# Patient Record
Sex: FEMALE | Race: WHITE | Employment: UNEMPLOYED | URBAN - METROPOLITAN AREA
[De-identification: names, ages, dates, MRNs, and addresses within clinical notes are randomized per-mention and may not be internally consistent; named-entity substitution may affect disease eponyms.]

---

## 2019-08-08 RX ORDER — CITALOPRAM 10 MG/1
10 TABLET ORAL DAILY
COMMUNITY

## 2019-08-08 RX ORDER — ATORVASTATIN CALCIUM 40 MG/1
40 TABLET, FILM COATED ORAL DAILY
COMMUNITY

## 2019-08-08 RX ORDER — LISINOPRIL 10 MG/1
12.5 TABLET ORAL DAILY
COMMUNITY

## 2019-08-08 RX ORDER — METFORMIN HYDROCHLORIDE 500 MG/1
500 TABLET, EXTENDED RELEASE ORAL
COMMUNITY

## 2019-08-08 RX ORDER — FENOFIBRATE 54 MG/1
54 TABLET ORAL DAILY
COMMUNITY

## 2019-08-13 ENCOUNTER — ANESTHESIA (OUTPATIENT)
Dept: INTERVENTIONAL RADIOLOGY/VASCULAR | Age: 69
End: 2019-08-13
Payer: MEDICARE

## 2019-08-13 ENCOUNTER — ANESTHESIA EVENT (OUTPATIENT)
Dept: OPERATING ROOM | Age: 69
End: 2019-08-13
Payer: MEDICARE

## 2019-08-13 ENCOUNTER — APPOINTMENT (OUTPATIENT)
Dept: GENERAL RADIOLOGY | Age: 69
End: 2019-08-13
Attending: UROLOGY
Payer: MEDICARE

## 2019-08-13 ENCOUNTER — HOSPITAL ENCOUNTER (OUTPATIENT)
Dept: INTERVENTIONAL RADIOLOGY/VASCULAR | Age: 69
Setting detail: OUTPATIENT SURGERY
Discharge: HOME | End: 2019-08-15
Attending: UROLOGY
Payer: MEDICARE

## 2019-08-13 ENCOUNTER — ANESTHESIA (OUTPATIENT)
Dept: OPERATING ROOM | Age: 69
End: 2019-08-13
Payer: MEDICARE

## 2019-08-13 ENCOUNTER — ANESTHESIA EVENT (OUTPATIENT)
Dept: INTERVENTIONAL RADIOLOGY/VASCULAR | Age: 69
End: 2019-08-13
Payer: MEDICARE

## 2019-08-13 ENCOUNTER — HOSPITAL ENCOUNTER (OUTPATIENT)
Age: 69
Setting detail: OBSERVATION
Discharge: HOME | End: 2019-08-15
Attending: UROLOGY | Admitting: UROLOGY
Payer: MEDICARE

## 2019-08-13 VITALS
OXYGEN SATURATION: 100 % | DIASTOLIC BLOOD PRESSURE: 82 MMHG | SYSTOLIC BLOOD PRESSURE: 106 MMHG | RESPIRATION RATE: 10 BRPM | TEMPERATURE: 95.8 F

## 2019-08-13 VITALS
OXYGEN SATURATION: 98 % | TEMPERATURE: 95.8 F | RESPIRATION RATE: 15 BRPM | SYSTOLIC BLOOD PRESSURE: 101 MMHG | DIASTOLIC BLOOD PRESSURE: 56 MMHG

## 2019-08-13 VITALS
OXYGEN SATURATION: 97 % | SYSTOLIC BLOOD PRESSURE: 122 MMHG | DIASTOLIC BLOOD PRESSURE: 63 MMHG | RESPIRATION RATE: 12 BRPM

## 2019-08-13 DIAGNOSIS — G89.18 POST-OP PAIN: Primary | ICD-10-CM

## 2019-08-13 LAB
ANION GAP SERPL CALCULATED.3IONS-SCNC: 11 MMOL/L (ref 9–17)
BUN BLDV-MCNC: 23 MG/DL (ref 8–23)
BUN/CREAT BLD: ABNORMAL (ref 9–20)
CALCIUM SERPL-MCNC: 9.9 MG/DL (ref 8.6–10.4)
CHLORIDE BLD-SCNC: 104 MMOL/L (ref 98–107)
CO2: 24 MMOL/L (ref 20–31)
CREAT SERPL-MCNC: 1.4 MG/DL (ref 0.5–0.9)
GFR AFRICAN AMERICAN: 45 ML/MIN
GFR NON-AFRICAN AMERICAN: 38 ML/MIN
GFR SERPL CREATININE-BSD FRML MDRD: ABNORMAL ML/MIN/{1.73_M2}
GFR SERPL CREATININE-BSD FRML MDRD: ABNORMAL ML/MIN/{1.73_M2}
GLUCOSE BLD-MCNC: 232 MG/DL (ref 65–105)
GLUCOSE BLD-MCNC: 243 MG/DL (ref 65–105)
GLUCOSE BLD-MCNC: 244 MG/DL (ref 65–105)
GLUCOSE BLD-MCNC: 257 MG/DL (ref 65–105)
GLUCOSE BLD-MCNC: 263 MG/DL (ref 70–99)
HCT VFR BLD CALC: 39.7 % (ref 36.3–47.1)
HCT VFR BLD CALC: 40.9 % (ref 36.3–47.1)
HEMOGLOBIN: 12.7 G/DL (ref 11.9–15.1)
HEMOGLOBIN: 13 G/DL (ref 11.9–15.1)
MCH RBC QN AUTO: 27.2 PG (ref 25.2–33.5)
MCH RBC QN AUTO: 27.8 PG (ref 25.2–33.5)
MCHC RBC AUTO-ENTMCNC: 31.1 G/DL (ref 28.4–34.8)
MCHC RBC AUTO-ENTMCNC: 32.7 G/DL (ref 28.4–34.8)
MCV RBC AUTO: 84.8 FL (ref 82.6–102.9)
MCV RBC AUTO: 87.6 FL (ref 82.6–102.9)
NRBC AUTOMATED: 0 PER 100 WBC
NRBC AUTOMATED: 0 PER 100 WBC
PARTIAL THROMBOPLASTIN TIME: 22.7 SEC (ref 20.5–30.5)
PDW BLD-RTO: 12.5 % (ref 11.8–14.4)
PDW BLD-RTO: 12.5 % (ref 11.8–14.4)
PLATELET # BLD: 241 K/UL (ref 138–453)
PLATELET # BLD: 248 K/UL (ref 138–453)
PMV BLD AUTO: 10 FL (ref 8.1–13.5)
PMV BLD AUTO: 9.9 FL (ref 8.1–13.5)
POTASSIUM SERPL-SCNC: 5.4 MMOL/L (ref 3.7–5.3)
RBC # BLD: 4.67 M/UL (ref 3.95–5.11)
RBC # BLD: 4.68 M/UL (ref 3.95–5.11)
SODIUM BLD-SCNC: 139 MMOL/L (ref 135–144)
WBC # BLD: 12.2 K/UL (ref 3.5–11.3)
WBC # BLD: 8.2 K/UL (ref 3.5–11.3)

## 2019-08-13 PROCEDURE — 3700000000 HC ANESTHESIA ATTENDED CARE: Performed by: UROLOGY

## 2019-08-13 PROCEDURE — 3700000001 HC ADD 15 MINUTES (ANESTHESIA): Performed by: UROLOGY

## 2019-08-13 PROCEDURE — 6360000002 HC RX W HCPCS: Performed by: SPECIALIST

## 2019-08-13 PROCEDURE — 3600000014 HC SURGERY LEVEL 4 ADDTL 15MIN: Performed by: UROLOGY

## 2019-08-13 PROCEDURE — 6360000002 HC RX W HCPCS: Performed by: STUDENT IN AN ORGANIZED HEALTH CARE EDUCATION/TRAINING PROGRAM

## 2019-08-13 PROCEDURE — 74018 RADEX ABDOMEN 1 VIEW: CPT

## 2019-08-13 PROCEDURE — 6360000002 HC RX W HCPCS: Performed by: NURSE ANESTHETIST, CERTIFIED REGISTERED

## 2019-08-13 PROCEDURE — 6360000004 HC RX CONTRAST MEDICATION: Performed by: UROLOGY

## 2019-08-13 PROCEDURE — 7100000000 HC PACU RECOVERY - FIRST 15 MIN: Performed by: UROLOGY

## 2019-08-13 PROCEDURE — 3700000000 HC ANESTHESIA ATTENDED CARE

## 2019-08-13 PROCEDURE — 2580000003 HC RX 258: Performed by: UROLOGY

## 2019-08-13 PROCEDURE — 6370000000 HC RX 637 (ALT 250 FOR IP): Performed by: STUDENT IN AN ORGANIZED HEALTH CARE EDUCATION/TRAINING PROGRAM

## 2019-08-13 PROCEDURE — 6360000002 HC RX W HCPCS: Performed by: ANESTHESIOLOGY

## 2019-08-13 PROCEDURE — 3600000013 HC SURGERY LEVEL 3 ADDTL 15MIN: Performed by: UROLOGY

## 2019-08-13 PROCEDURE — 82947 ASSAY GLUCOSE BLOOD QUANT: CPT

## 2019-08-13 PROCEDURE — 7100000040 HC SPAR PHASE II RECOVERY - FIRST 15 MIN: Performed by: UROLOGY

## 2019-08-13 PROCEDURE — 7100000001 HC PACU RECOVERY - ADDTL 15 MIN: Performed by: UROLOGY

## 2019-08-13 PROCEDURE — 3600000003 HC SURGERY LEVEL 3 BASE: Performed by: UROLOGY

## 2019-08-13 PROCEDURE — 2580000003 HC RX 258: Performed by: STUDENT IN AN ORGANIZED HEALTH CARE EDUCATION/TRAINING PROGRAM

## 2019-08-13 PROCEDURE — 3700000001 HC ADD 15 MINUTES (ANESTHESIA)

## 2019-08-13 PROCEDURE — 2580000003 HC RX 258: Performed by: ANESTHESIOLOGY

## 2019-08-13 PROCEDURE — 2580000003 HC RX 258: Performed by: SPECIALIST

## 2019-08-13 PROCEDURE — 85730 THROMBOPLASTIN TIME PARTIAL: CPT

## 2019-08-13 PROCEDURE — 2500000003 HC RX 250 WO HCPCS: Performed by: SPECIALIST

## 2019-08-13 PROCEDURE — 2720000010 HC SURG SUPPLY STERILE: Performed by: UROLOGY

## 2019-08-13 PROCEDURE — 7100000041 HC SPAR PHASE II RECOVERY - ADDTL 15 MIN: Performed by: UROLOGY

## 2019-08-13 PROCEDURE — 3600000004 HC SURGERY LEVEL 4 BASE: Performed by: UROLOGY

## 2019-08-13 PROCEDURE — 87086 URINE CULTURE/COLONY COUNT: CPT

## 2019-08-13 PROCEDURE — 85027 COMPLETE CBC AUTOMATED: CPT

## 2019-08-13 PROCEDURE — 2709999900 HC NON-CHARGEABLE SUPPLY: Performed by: UROLOGY

## 2019-08-13 PROCEDURE — C1758 CATHETER, URETERAL: HCPCS | Performed by: UROLOGY

## 2019-08-13 PROCEDURE — G0378 HOSPITAL OBSERVATION PER HR: HCPCS

## 2019-08-13 PROCEDURE — C1769 GUIDE WIRE: HCPCS

## 2019-08-13 PROCEDURE — 50433 PLMT NEPHROURETERAL CATHETER: CPT | Performed by: RADIOLOGY

## 2019-08-13 PROCEDURE — 6360000002 HC RX W HCPCS: Performed by: UROLOGY

## 2019-08-13 PROCEDURE — C2628 CATHETER, OCCLUSION: HCPCS | Performed by: UROLOGY

## 2019-08-13 PROCEDURE — 80048 BASIC METABOLIC PNL TOTAL CA: CPT

## 2019-08-13 PROCEDURE — C2617 STENT, NON-COR, TEM W/O DEL: HCPCS | Performed by: UROLOGY

## 2019-08-13 PROCEDURE — 2709999900 HC NON-CHARGEABLE SUPPLY

## 2019-08-13 PROCEDURE — C1769 GUIDE WIRE: HCPCS | Performed by: UROLOGY

## 2019-08-13 PROCEDURE — C1726 CATH, BAL DIL, NON-VASCULAR: HCPCS | Performed by: UROLOGY

## 2019-08-13 PROCEDURE — C1894 INTRO/SHEATH, NON-LASER: HCPCS | Performed by: UROLOGY

## 2019-08-13 DEVICE — URETERAL STENT
Type: IMPLANTABLE DEVICE | Status: FUNCTIONAL
Brand: POLARIS™ ULTRA

## 2019-08-13 RX ORDER — CIPROFLOXACIN 2 MG/ML
400 INJECTION, SOLUTION INTRAVENOUS ONCE
Status: COMPLETED | OUTPATIENT
Start: 2019-08-13 | End: 2019-08-13

## 2019-08-13 RX ORDER — PROMETHAZINE HYDROCHLORIDE 25 MG/ML
6.25 INJECTION, SOLUTION INTRAMUSCULAR; INTRAVENOUS
Status: DISCONTINUED | OUTPATIENT
Start: 2019-08-13 | End: 2019-08-13 | Stop reason: HOSPADM

## 2019-08-13 RX ORDER — 0.9 % SODIUM CHLORIDE 0.9 %
500 INTRAVENOUS SOLUTION INTRAVENOUS
Status: DISCONTINUED | OUTPATIENT
Start: 2019-08-13 | End: 2019-08-13 | Stop reason: HOSPADM

## 2019-08-13 RX ORDER — MORPHINE SULFATE 2 MG/ML
2 INJECTION, SOLUTION INTRAMUSCULAR; INTRAVENOUS EVERY 4 HOURS PRN
Status: DISCONTINUED | OUTPATIENT
Start: 2019-08-13 | End: 2019-08-15

## 2019-08-13 RX ORDER — FENTANYL CITRATE 50 UG/ML
25 INJECTION, SOLUTION INTRAMUSCULAR; INTRAVENOUS EVERY 5 MIN PRN
Status: DISCONTINUED | OUTPATIENT
Start: 2019-08-13 | End: 2019-08-13 | Stop reason: HOSPADM

## 2019-08-13 RX ORDER — DIPHENHYDRAMINE HYDROCHLORIDE 50 MG/ML
12.5 INJECTION INTRAMUSCULAR; INTRAVENOUS
Status: DISCONTINUED | OUTPATIENT
Start: 2019-08-13 | End: 2019-08-13 | Stop reason: HOSPADM

## 2019-08-13 RX ORDER — HYDROCODONE BITARTRATE AND ACETAMINOPHEN 5; 325 MG/1; MG/1
1 TABLET ORAL EVERY 6 HOURS PRN
Status: DISCONTINUED | OUTPATIENT
Start: 2019-08-13 | End: 2019-08-15 | Stop reason: HOSPADM

## 2019-08-13 RX ORDER — SCOLOPAMINE TRANSDERMAL SYSTEM 1 MG/1
1 PATCH, EXTENDED RELEASE TRANSDERMAL ONCE
Status: DISCONTINUED | OUTPATIENT
Start: 2019-08-13 | End: 2019-08-13 | Stop reason: HOSPADM

## 2019-08-13 RX ORDER — DEXTROSE MONOHYDRATE 50 MG/ML
100 INJECTION, SOLUTION INTRAVENOUS PRN
Status: DISCONTINUED | OUTPATIENT
Start: 2019-08-13 | End: 2019-08-15 | Stop reason: HOSPADM

## 2019-08-13 RX ORDER — MIDAZOLAM HYDROCHLORIDE 1 MG/ML
INJECTION INTRAMUSCULAR; INTRAVENOUS PRN
Status: DISCONTINUED | OUTPATIENT
Start: 2019-08-13 | End: 2019-08-13 | Stop reason: SDUPTHER

## 2019-08-13 RX ORDER — DOCUSATE SODIUM 100 MG/1
100 CAPSULE, LIQUID FILLED ORAL 2 TIMES DAILY
Status: DISCONTINUED | OUTPATIENT
Start: 2019-08-13 | End: 2019-08-15 | Stop reason: HOSPADM

## 2019-08-13 RX ORDER — ROCURONIUM BROMIDE 10 MG/ML
INJECTION, SOLUTION INTRAVENOUS PRN
Status: DISCONTINUED | OUTPATIENT
Start: 2019-08-13 | End: 2019-08-13 | Stop reason: SDUPTHER

## 2019-08-13 RX ORDER — MAGNESIUM HYDROXIDE 1200 MG/15ML
LIQUID ORAL CONTINUOUS PRN
Status: COMPLETED | OUTPATIENT
Start: 2019-08-13 | End: 2019-08-13

## 2019-08-13 RX ORDER — LIDOCAINE HYDROCHLORIDE 10 MG/ML
INJECTION, SOLUTION EPIDURAL; INFILTRATION; INTRACAUDAL; PERINEURAL PRN
Status: DISCONTINUED | OUTPATIENT
Start: 2019-08-13 | End: 2019-08-13 | Stop reason: SDUPTHER

## 2019-08-13 RX ORDER — FENTANYL CITRATE 50 UG/ML
50 INJECTION, SOLUTION INTRAMUSCULAR; INTRAVENOUS EVERY 5 MIN PRN
Status: DISCONTINUED | OUTPATIENT
Start: 2019-08-13 | End: 2019-08-13 | Stop reason: HOSPADM

## 2019-08-13 RX ORDER — LABETALOL HYDROCHLORIDE 5 MG/ML
5 INJECTION, SOLUTION INTRAVENOUS EVERY 10 MIN PRN
Status: DISCONTINUED | OUTPATIENT
Start: 2019-08-13 | End: 2019-08-13 | Stop reason: HOSPADM

## 2019-08-13 RX ORDER — HYDROCODONE BITARTRATE AND ACETAMINOPHEN 5; 325 MG/1; MG/1
2 TABLET ORAL EVERY 6 HOURS PRN
Status: DISCONTINUED | OUTPATIENT
Start: 2019-08-13 | End: 2019-08-15 | Stop reason: HOSPADM

## 2019-08-13 RX ORDER — INSULIN GLARGINE 100 [IU]/ML
60 INJECTION, SOLUTION SUBCUTANEOUS EVERY MORNING
Status: DISCONTINUED | OUTPATIENT
Start: 2019-08-14 | End: 2019-08-15 | Stop reason: HOSPADM

## 2019-08-13 RX ORDER — TAMSULOSIN HYDROCHLORIDE 0.4 MG/1
0.4 CAPSULE ORAL DAILY
Qty: 30 CAPSULE | Refills: 0 | Status: SHIPPED | OUTPATIENT
Start: 2019-08-13

## 2019-08-13 RX ORDER — ACETAMINOPHEN 325 MG/1
650 TABLET ORAL EVERY 6 HOURS PRN
Status: DISCONTINUED | OUTPATIENT
Start: 2019-08-13 | End: 2019-08-15 | Stop reason: HOSPADM

## 2019-08-13 RX ORDER — SODIUM CHLORIDE, SODIUM LACTATE, POTASSIUM CHLORIDE, CALCIUM CHLORIDE 600; 310; 30; 20 MG/100ML; MG/100ML; MG/100ML; MG/100ML
INJECTION, SOLUTION INTRAVENOUS CONTINUOUS PRN
Status: DISCONTINUED | OUTPATIENT
Start: 2019-08-13 | End: 2019-08-13 | Stop reason: SDUPTHER

## 2019-08-13 RX ORDER — MAGNESIUM SULFATE 1 G/100ML
1 INJECTION INTRAVENOUS PRN
Status: DISCONTINUED | OUTPATIENT
Start: 2019-08-13 | End: 2019-08-15 | Stop reason: HOSPADM

## 2019-08-13 RX ORDER — DEXTROSE MONOHYDRATE 25 G/50ML
12.5 INJECTION, SOLUTION INTRAVENOUS PRN
Status: DISCONTINUED | OUTPATIENT
Start: 2019-08-13 | End: 2019-08-15 | Stop reason: HOSPADM

## 2019-08-13 RX ORDER — ONDANSETRON 2 MG/ML
4 INJECTION INTRAMUSCULAR; INTRAVENOUS EVERY 6 HOURS PRN
Status: DISCONTINUED | OUTPATIENT
Start: 2019-08-13 | End: 2019-08-15 | Stop reason: HOSPADM

## 2019-08-13 RX ORDER — SODIUM CHLORIDE 9 MG/ML
INJECTION, SOLUTION INTRAVENOUS CONTINUOUS
Status: DISCONTINUED | OUTPATIENT
Start: 2019-08-13 | End: 2019-08-14

## 2019-08-13 RX ORDER — OXYCODONE HYDROCHLORIDE AND ACETAMINOPHEN 5; 325 MG/1; MG/1
1 TABLET ORAL EVERY 4 HOURS PRN
Status: DISCONTINUED | OUTPATIENT
Start: 2019-08-13 | End: 2019-08-13 | Stop reason: HOSPADM

## 2019-08-13 RX ORDER — HYDRALAZINE HYDROCHLORIDE 20 MG/ML
5 INJECTION INTRAMUSCULAR; INTRAVENOUS EVERY 10 MIN PRN
Status: DISCONTINUED | OUTPATIENT
Start: 2019-08-13 | End: 2019-08-13 | Stop reason: HOSPADM

## 2019-08-13 RX ORDER — HYDRALAZINE HYDROCHLORIDE 25 MG/1
25 TABLET, FILM COATED ORAL EVERY 8 HOURS PRN
Status: DISCONTINUED | OUTPATIENT
Start: 2019-08-13 | End: 2019-08-15 | Stop reason: HOSPADM

## 2019-08-13 RX ORDER — DOCUSATE SODIUM 100 MG/1
100 CAPSULE, LIQUID FILLED ORAL 2 TIMES DAILY
Qty: 60 CAPSULE | Refills: 0 | Status: SHIPPED | OUTPATIENT
Start: 2019-08-13 | End: 2019-09-12

## 2019-08-13 RX ORDER — NICOTINE POLACRILEX 4 MG
15 LOZENGE BUCCAL PRN
Status: DISCONTINUED | OUTPATIENT
Start: 2019-08-13 | End: 2019-08-15 | Stop reason: HOSPADM

## 2019-08-13 RX ORDER — POTASSIUM CHLORIDE 7.45 MG/ML
10 INJECTION INTRAVENOUS PRN
Status: DISCONTINUED | OUTPATIENT
Start: 2019-08-13 | End: 2019-08-15 | Stop reason: HOSPADM

## 2019-08-13 RX ORDER — PROMETHAZINE HYDROCHLORIDE 25 MG/ML
6.25 INJECTION, SOLUTION INTRAMUSCULAR; INTRAVENOUS
Status: COMPLETED | OUTPATIENT
Start: 2019-08-13 | End: 2019-08-13

## 2019-08-13 RX ORDER — TAMSULOSIN HYDROCHLORIDE 0.4 MG/1
0.4 CAPSULE ORAL DAILY
Status: DISCONTINUED | OUTPATIENT
Start: 2019-08-13 | End: 2019-08-15 | Stop reason: HOSPADM

## 2019-08-13 RX ORDER — MAGNESIUM HYDROXIDE 1200 MG/15ML
LIQUID ORAL PRN
Status: DISCONTINUED | OUTPATIENT
Start: 2019-08-13 | End: 2019-08-13 | Stop reason: ALTCHOICE

## 2019-08-13 RX ORDER — GLYCOPYRROLATE 1 MG/5 ML
SYRINGE (ML) INTRAVENOUS PRN
Status: DISCONTINUED | OUTPATIENT
Start: 2019-08-13 | End: 2019-08-13 | Stop reason: SDUPTHER

## 2019-08-13 RX ORDER — ONDANSETRON 2 MG/ML
INJECTION INTRAMUSCULAR; INTRAVENOUS PRN
Status: DISCONTINUED | OUTPATIENT
Start: 2019-08-13 | End: 2019-08-13 | Stop reason: SDUPTHER

## 2019-08-13 RX ORDER — LIDOCAINE HYDROCHLORIDE 10 MG/ML
1 INJECTION, SOLUTION EPIDURAL; INFILTRATION; INTRACAUDAL; PERINEURAL
Status: DISCONTINUED | OUTPATIENT
Start: 2019-08-13 | End: 2019-08-13 | Stop reason: HOSPADM

## 2019-08-13 RX ORDER — ONDANSETRON 2 MG/ML
4 INJECTION INTRAMUSCULAR; INTRAVENOUS DAILY PRN
Status: DISCONTINUED | OUTPATIENT
Start: 2019-08-13 | End: 2019-08-13 | Stop reason: HOSPADM

## 2019-08-13 RX ORDER — FENTANYL CITRATE 50 UG/ML
INJECTION, SOLUTION INTRAMUSCULAR; INTRAVENOUS PRN
Status: DISCONTINUED | OUTPATIENT
Start: 2019-08-13 | End: 2019-08-13 | Stop reason: SDUPTHER

## 2019-08-13 RX ORDER — CITALOPRAM 10 MG/1
10 TABLET ORAL DAILY
Status: DISCONTINUED | OUTPATIENT
Start: 2019-08-13 | End: 2019-08-15 | Stop reason: HOSPADM

## 2019-08-13 RX ORDER — FENTANYL CITRATE 50 UG/ML
50 INJECTION, SOLUTION INTRAMUSCULAR; INTRAVENOUS EVERY 5 MIN PRN
Status: COMPLETED | OUTPATIENT
Start: 2019-08-13 | End: 2019-08-13

## 2019-08-13 RX ORDER — SODIUM CHLORIDE 0.9 % (FLUSH) 0.9 %
10 SYRINGE (ML) INJECTION EVERY 12 HOURS SCHEDULED
Status: DISCONTINUED | OUTPATIENT
Start: 2019-08-13 | End: 2019-08-15 | Stop reason: HOSPADM

## 2019-08-13 RX ORDER — ONDANSETRON 2 MG/ML
4 INJECTION INTRAMUSCULAR; INTRAVENOUS
Status: DISCONTINUED | OUTPATIENT
Start: 2019-08-13 | End: 2019-08-13 | Stop reason: HOSPADM

## 2019-08-13 RX ORDER — MIDAZOLAM HYDROCHLORIDE 1 MG/ML
1 INJECTION INTRAMUSCULAR; INTRAVENOUS
Status: COMPLETED | OUTPATIENT
Start: 2019-08-13 | End: 2019-08-13

## 2019-08-13 RX ORDER — 0.9 % SODIUM CHLORIDE 0.9 %
VIAL (ML) INJECTION PRN
Status: DISCONTINUED | OUTPATIENT
Start: 2019-08-13 | End: 2019-08-13 | Stop reason: ALTCHOICE

## 2019-08-13 RX ORDER — PROPOFOL 10 MG/ML
INJECTION, EMULSION INTRAVENOUS PRN
Status: DISCONTINUED | OUTPATIENT
Start: 2019-08-13 | End: 2019-08-13 | Stop reason: SDUPTHER

## 2019-08-13 RX ORDER — POTASSIUM CHLORIDE 20 MEQ/1
40 TABLET, EXTENDED RELEASE ORAL PRN
Status: DISCONTINUED | OUTPATIENT
Start: 2019-08-13 | End: 2019-08-15 | Stop reason: HOSPADM

## 2019-08-13 RX ORDER — PROPOFOL 10 MG/ML
INJECTION, EMULSION INTRAVENOUS CONTINUOUS PRN
Status: DISCONTINUED | OUTPATIENT
Start: 2019-08-13 | End: 2019-08-13 | Stop reason: SDUPTHER

## 2019-08-13 RX ORDER — HYDROCODONE BITARTRATE AND ACETAMINOPHEN 5; 325 MG/1; MG/1
1 TABLET ORAL EVERY 4 HOURS PRN
Qty: 18 TABLET | Refills: 0 | Status: SHIPPED | OUTPATIENT
Start: 2019-08-13 | End: 2019-08-18

## 2019-08-13 RX ORDER — SODIUM CHLORIDE 0.9 % (FLUSH) 0.9 %
10 SYRINGE (ML) INJECTION PRN
Status: DISCONTINUED | OUTPATIENT
Start: 2019-08-13 | End: 2019-08-15 | Stop reason: HOSPADM

## 2019-08-13 RX ORDER — NEOSTIGMINE METHYLSULFATE 5 MG/5 ML
SYRINGE (ML) INTRAVENOUS PRN
Status: DISCONTINUED | OUTPATIENT
Start: 2019-08-13 | End: 2019-08-13 | Stop reason: SDUPTHER

## 2019-08-13 RX ORDER — SODIUM CHLORIDE, SODIUM LACTATE, POTASSIUM CHLORIDE, CALCIUM CHLORIDE 600; 310; 30; 20 MG/100ML; MG/100ML; MG/100ML; MG/100ML
INJECTION, SOLUTION INTRAVENOUS CONTINUOUS
Status: DISCONTINUED | OUTPATIENT
Start: 2019-08-13 | End: 2019-08-13

## 2019-08-13 RX ORDER — MIDAZOLAM HYDROCHLORIDE 1 MG/ML
1 INJECTION INTRAMUSCULAR; INTRAVENOUS EVERY 10 MIN PRN
Status: DISCONTINUED | OUTPATIENT
Start: 2019-08-13 | End: 2019-08-13 | Stop reason: HOSPADM

## 2019-08-13 RX ADMIN — SODIUM CHLORIDE: 9 INJECTION, SOLUTION INTRAVENOUS at 20:35

## 2019-08-13 RX ADMIN — PROPOFOL 120 MCG/KG/MIN: 10 INJECTION, EMULSION INTRAVENOUS at 09:22

## 2019-08-13 RX ADMIN — Medication 2 G: at 16:47

## 2019-08-13 RX ADMIN — FENTANYL CITRATE 25 MCG: 50 INJECTION INTRAMUSCULAR; INTRAVENOUS at 09:22

## 2019-08-13 RX ADMIN — FENTANYL CITRATE 50 MCG: 50 INJECTION INTRAMUSCULAR; INTRAVENOUS at 10:25

## 2019-08-13 RX ADMIN — MIDAZOLAM HYDROCHLORIDE 1 MG: 1 INJECTION, SOLUTION INTRAMUSCULAR; INTRAVENOUS at 09:23

## 2019-08-13 RX ADMIN — FENTANYL CITRATE 25 MCG: 50 INJECTION INTRAMUSCULAR; INTRAVENOUS at 10:35

## 2019-08-13 RX ADMIN — FENTANYL CITRATE 50 MCG: 50 INJECTION INTRAMUSCULAR; INTRAVENOUS at 12:54

## 2019-08-13 RX ADMIN — FENTANYL CITRATE 25 MCG: 50 INJECTION INTRAMUSCULAR; INTRAVENOUS at 09:32

## 2019-08-13 RX ADMIN — IOVERSOL 22 ML: 509 INJECTION INTRA-ARTERIAL; INTRAVENOUS at 16:24

## 2019-08-13 RX ADMIN — MIDAZOLAM HYDROCHLORIDE 1 MG: 1 INJECTION, SOLUTION INTRAMUSCULAR; INTRAVENOUS at 14:34

## 2019-08-13 RX ADMIN — Medication 5 MG: at 17:35

## 2019-08-13 RX ADMIN — MORPHINE SULFATE 2 MG: 2 INJECTION, SOLUTION INTRAMUSCULAR; INTRAVENOUS at 21:29

## 2019-08-13 RX ADMIN — PROMETHAZINE HYDROCHLORIDE 6.25 MG: 25 INJECTION INTRAMUSCULAR; INTRAVENOUS at 18:25

## 2019-08-13 RX ADMIN — ONDANSETRON 4 MG: 2 INJECTION INTRAMUSCULAR; INTRAVENOUS at 20:28

## 2019-08-13 RX ADMIN — FENTANYL CITRATE 50 MCG: 50 INJECTION INTRAMUSCULAR; INTRAVENOUS at 13:37

## 2019-08-13 RX ADMIN — PROPOFOL 50 MG: 10 INJECTION, EMULSION INTRAVENOUS at 16:17

## 2019-08-13 RX ADMIN — SUGAMMADEX 200 MG: 100 INJECTION, SOLUTION INTRAVENOUS at 17:47

## 2019-08-13 RX ADMIN — PHENYLEPHRINE HYDROCHLORIDE 100 MCG: 10 INJECTION INTRAVENOUS at 17:28

## 2019-08-13 RX ADMIN — CEFAZOLIN 1 G: 1 INJECTION, POWDER, FOR SOLUTION INTRAMUSCULAR; INTRAVENOUS at 21:30

## 2019-08-13 RX ADMIN — SODIUM CHLORIDE, POTASSIUM CHLORIDE, SODIUM LACTATE AND CALCIUM CHLORIDE: 600; 310; 30; 20 INJECTION, SOLUTION INTRAVENOUS at 08:55

## 2019-08-13 RX ADMIN — SODIUM CHLORIDE, POTASSIUM CHLORIDE, SODIUM LACTATE AND CALCIUM CHLORIDE: 600; 310; 30; 20 INJECTION, SOLUTION INTRAVENOUS at 09:16

## 2019-08-13 RX ADMIN — FENTANYL CITRATE 50 MCG: 50 INJECTION INTRAMUSCULAR; INTRAVENOUS at 09:23

## 2019-08-13 RX ADMIN — FENTANYL CITRATE 50 MCG: 50 INJECTION INTRAMUSCULAR; INTRAVENOUS at 11:35

## 2019-08-13 RX ADMIN — LIDOCAINE HYDROCHLORIDE 50 MG: 10 INJECTION, SOLUTION EPIDURAL; INFILTRATION; INTRACAUDAL at 15:13

## 2019-08-13 RX ADMIN — PHENYLEPHRINE HYDROCHLORIDE 100 MCG: 10 INJECTION INTRAVENOUS at 16:45

## 2019-08-13 RX ADMIN — ROCURONIUM BROMIDE 50 MG: 10 INJECTION INTRAVENOUS at 15:13

## 2019-08-13 RX ADMIN — FENTANYL CITRATE 50 MCG: 50 INJECTION INTRAMUSCULAR; INTRAVENOUS at 12:19

## 2019-08-13 RX ADMIN — ONDANSETRON 4 MG: 2 INJECTION, SOLUTION INTRAMUSCULAR; INTRAVENOUS at 17:30

## 2019-08-13 RX ADMIN — MIDAZOLAM HYDROCHLORIDE 0.5 MG: 1 INJECTION, SOLUTION INTRAMUSCULAR; INTRAVENOUS at 09:32

## 2019-08-13 RX ADMIN — MIDAZOLAM HYDROCHLORIDE 0.5 MG: 1 INJECTION, SOLUTION INTRAMUSCULAR; INTRAVENOUS at 09:22

## 2019-08-13 RX ADMIN — CIPROFLOXACIN 400 MG: 2 INJECTION, SOLUTION INTRAVENOUS at 09:22

## 2019-08-13 RX ADMIN — ONDANSETRON 4 MG: 2 INJECTION INTRAMUSCULAR; INTRAVENOUS at 13:47

## 2019-08-13 RX ADMIN — ROCURONIUM BROMIDE 50 MG: 10 INJECTION INTRAVENOUS at 16:15

## 2019-08-13 RX ADMIN — FENTANYL CITRATE 50 MCG: 50 INJECTION INTRAMUSCULAR; INTRAVENOUS at 11:46

## 2019-08-13 RX ADMIN — FENTANYL CITRATE 50 MCG: 50 INJECTION INTRAMUSCULAR; INTRAVENOUS at 15:08

## 2019-08-13 RX ADMIN — Medication 0.8 MG: at 17:35

## 2019-08-13 RX ADMIN — INSULIN LISPRO 3 UNITS: 100 INJECTION, SOLUTION INTRAVENOUS; SUBCUTANEOUS at 21:33

## 2019-08-13 RX ADMIN — FENTANYL CITRATE 50 MCG: 50 INJECTION INTRAMUSCULAR; INTRAVENOUS at 16:17

## 2019-08-13 RX ADMIN — FENTANYL CITRATE 25 MCG: 50 INJECTION INTRAMUSCULAR; INTRAVENOUS at 10:55

## 2019-08-13 RX ADMIN — PROPOFOL 150 MG: 10 INJECTION, EMULSION INTRAVENOUS at 15:13

## 2019-08-13 ASSESSMENT — PULMONARY FUNCTION TESTS
PIF_VALUE: 26
PIF_VALUE: 26
PIF_VALUE: 27
PIF_VALUE: 0
PIF_VALUE: 27
PIF_VALUE: 26
PIF_VALUE: 26
PIF_VALUE: 0
PIF_VALUE: 2
PIF_VALUE: 26
PIF_VALUE: 27
PIF_VALUE: 25
PIF_VALUE: 20
PIF_VALUE: 0
PIF_VALUE: 25
PIF_VALUE: 27
PIF_VALUE: 26
PIF_VALUE: 0
PIF_VALUE: 26
PIF_VALUE: 26
PIF_VALUE: 0
PIF_VALUE: 26
PIF_VALUE: 10
PIF_VALUE: 27
PIF_VALUE: 25
PIF_VALUE: 20
PIF_VALUE: 26
PIF_VALUE: 0
PIF_VALUE: 25
PIF_VALUE: 26
PIF_VALUE: 26
PIF_VALUE: 25
PIF_VALUE: 26
PIF_VALUE: 29
PIF_VALUE: 27
PIF_VALUE: 26
PIF_VALUE: 26
PIF_VALUE: 5
PIF_VALUE: 4
PIF_VALUE: 29
PIF_VALUE: 26
PIF_VALUE: 0
PIF_VALUE: 0
PIF_VALUE: 26
PIF_VALUE: 26
PIF_VALUE: 27
PIF_VALUE: 27
PIF_VALUE: 25
PIF_VALUE: 1
PIF_VALUE: 26
PIF_VALUE: 27
PIF_VALUE: 25
PIF_VALUE: 26
PIF_VALUE: 5
PIF_VALUE: 28
PIF_VALUE: 27
PIF_VALUE: 0
PIF_VALUE: 1
PIF_VALUE: 24
PIF_VALUE: 26
PIF_VALUE: 22
PIF_VALUE: 26
PIF_VALUE: 0
PIF_VALUE: 28
PIF_VALUE: 27
PIF_VALUE: 0
PIF_VALUE: 26
PIF_VALUE: 26
PIF_VALUE: 28
PIF_VALUE: 26
PIF_VALUE: 26
PIF_VALUE: 4
PIF_VALUE: 31
PIF_VALUE: 26
PIF_VALUE: 27
PIF_VALUE: 26
PIF_VALUE: 25
PIF_VALUE: 0
PIF_VALUE: 26
PIF_VALUE: 29
PIF_VALUE: 26
PIF_VALUE: 26
PIF_VALUE: 25
PIF_VALUE: 0
PIF_VALUE: 26
PIF_VALUE: 15
PIF_VALUE: 26
PIF_VALUE: 0
PIF_VALUE: 14
PIF_VALUE: 2
PIF_VALUE: 0
PIF_VALUE: 20
PIF_VALUE: 20
PIF_VALUE: 7
PIF_VALUE: 26
PIF_VALUE: 1
PIF_VALUE: 28
PIF_VALUE: 2
PIF_VALUE: 24
PIF_VALUE: 0
PIF_VALUE: 25
PIF_VALUE: 1
PIF_VALUE: 36
PIF_VALUE: 43
PIF_VALUE: 24
PIF_VALUE: 27
PIF_VALUE: 25
PIF_VALUE: 26
PIF_VALUE: 30
PIF_VALUE: 25
PIF_VALUE: 2
PIF_VALUE: 33
PIF_VALUE: 26
PIF_VALUE: 26
PIF_VALUE: 0
PIF_VALUE: 26
PIF_VALUE: 26
PIF_VALUE: 23
PIF_VALUE: 25
PIF_VALUE: 26
PIF_VALUE: 26
PIF_VALUE: 0
PIF_VALUE: 26
PIF_VALUE: 26
PIF_VALUE: 28
PIF_VALUE: 4
PIF_VALUE: 28
PIF_VALUE: 27
PIF_VALUE: 1
PIF_VALUE: 26
PIF_VALUE: 27
PIF_VALUE: 0
PIF_VALUE: 27
PIF_VALUE: 5
PIF_VALUE: 27
PIF_VALUE: 26
PIF_VALUE: 26
PIF_VALUE: 25
PIF_VALUE: 20
PIF_VALUE: 26
PIF_VALUE: 26
PIF_VALUE: 20
PIF_VALUE: 26
PIF_VALUE: 26
PIF_VALUE: 23
PIF_VALUE: 0
PIF_VALUE: 26
PIF_VALUE: 25
PIF_VALUE: 1
PIF_VALUE: 24
PIF_VALUE: 27
PIF_VALUE: 0
PIF_VALUE: 26
PIF_VALUE: 32
PIF_VALUE: 25
PIF_VALUE: 0
PIF_VALUE: 3
PIF_VALUE: 1
PIF_VALUE: 28
PIF_VALUE: 26
PIF_VALUE: 25
PIF_VALUE: 25
PIF_VALUE: 24
PIF_VALUE: 29
PIF_VALUE: 0
PIF_VALUE: 3
PIF_VALUE: 26
PIF_VALUE: 25
PIF_VALUE: 26
PIF_VALUE: 30
PIF_VALUE: 23
PIF_VALUE: 1
PIF_VALUE: 38
PIF_VALUE: 1
PIF_VALUE: 1
PIF_VALUE: 26
PIF_VALUE: 25
PIF_VALUE: 25
PIF_VALUE: 6

## 2019-08-13 ASSESSMENT — PAIN SCALES - GENERAL
PAINLEVEL_OUTOF10: 6
PAINLEVEL_OUTOF10: 8
PAINLEVEL_OUTOF10: 4
PAINLEVEL_OUTOF10: 0
PAINLEVEL_OUTOF10: 9
PAINLEVEL_OUTOF10: 10
PAINLEVEL_OUTOF10: 8
PAINLEVEL_OUTOF10: 8
PAINLEVEL_OUTOF10: 10
PAINLEVEL_OUTOF10: 8

## 2019-08-13 ASSESSMENT — LIFESTYLE VARIABLES
SMOKING_STATUS: 1

## 2019-08-13 ASSESSMENT — PAIN DESCRIPTION - PAIN TYPE
TYPE: SURGICAL PAIN
TYPE: SURGICAL PAIN

## 2019-08-13 ASSESSMENT — PAIN DESCRIPTION - LOCATION: LOCATION: PERINEUM

## 2019-08-13 ASSESSMENT — PAIN - FUNCTIONAL ASSESSMENT: PAIN_FUNCTIONAL_ASSESSMENT: 0-10

## 2019-08-13 NOTE — H&P
History and Physical     Patient:  Lisbeth Red  MRN: [id number]  YOB: 1950     CHIEF COMPLAINT:  Left renal stone     HISTORY OF PRESENT ILLNESS:   The patient is a 81 y.o. female who presents with left renal stone. She is here for treatment of that with percutaneous nephrolithotomy.         Past Medical History:    Past Medical History        Past Medical History:   Diagnosis Date    Cancer ([facility] [address] 2017     BASAL CELL ABOVE LEFT EYE    Diabetes mellitus ([facility] [address] 2009     IDDM    Endometriosis 1988    Hyperlipidemia 2009     ON RX    Hypertension 2009     ON RX    Kidney stone 2007     X 2    Shingles 01/2019    Sleep apnea 2009     HAS NOT MACHINE IN 6 YEARS    Wears glasses              Past Surgical History:    Past Surgical History         Past Surgical History:   Procedure Laterality Date    ABDOMEN SURGERY   07/1950     PYLORIC STENOSIS REPAIR    BACK SURGERY   2006     LUMBAR HERNIATED DISC-DR GALINDO    HYSTERECTOMY   1988     PARTIAL-1 OVARY REMAINS-DR COE SR BOWLING GREEN    JOINT REPLACEMENT Right 2015     KNEE-DR PATEL BOWLING GREEN    KIDNEY STONE SURGERY   2007     CYSTO WITH STONE BASKETING    LITHOTRIPSY   2007            Medications Prior to Admission:    Home Medications           Prior to Admission medications    Medication Sig Start Date End Date Taking?  Authorizing Provider   Insulin Detemir (LEVEMIR FLEXPEN SC) Inject 60 Units into the skin daily     Yes Historical Provider, MD   Semaglutide (OZEMPIC) 0.25 or 0.5 MG/DOSE SOPN Inject 0.25 mg into the skin once a week Thursday     Yes Historical Provider, MD   metFORMIN (GLUCOPHAGE-XR) 500 MG extended release tablet Take 500 mg by mouth daily (with breakfast)     Yes Historical Provider, MD   atorvastatin (LIPITOR) 40 MG tablet Take 40 mg by mouth daily     Yes Historical Provider, MD   lisinopril (PRINIVIL;ZESTRIL) 10 MG tablet Take 12.5 mg by mouth daily     Yes Historical Provider, MD   citalopram (CELEXA) 10 MG

## 2019-08-13 NOTE — ANESTHESIA PRE PROCEDURE
RX    Hypertension 2009    ON RX    Kidney stone 2007    X 2    Shingles 01/2019    Sleep apnea 2009    HAS NOT MACHINE IN 6 YEARS    Wears glasses        Past Surgical History:        Procedure Laterality Date    ABDOMEN SURGERY  07/1950    PYLORIC STENOSIS REPAIR    BACK SURGERY  2006    LUMBAR HERNIATED DISC-DR GALINDO    HYSTERECTOMY  1988    PARTIAL-1 OVARY REMAINS-DR COE SR BOWLING GREEN    JOINT REPLACEMENT Right 2015    KNEE-DR PATEL BOWLING GREEN    KIDNEY STONE SURGERY  2007    CYSTO WITH STONE BASKETING    LITHOTRIPSY  2007       Social History:    Social History     Tobacco Use    Smoking status: Current Every Day Smoker     Packs/day: 0.50     Years: 20.00     Pack years: 10.00    Smokeless tobacco: Never Used    Tobacco comment: ON AND OFF   Substance Use Topics    Alcohol use: Not Currently                                Ready to quit: Not Answered  Counseling given: Not Answered  Comment: ON AND OFF      Vital Signs (Current): There were no vitals filed for this visit.                                            BP Readings from Last 3 Encounters:   08/13/19 (!) 155/82   08/13/19 122/63       NPO Status:                                                                                 BMI:   Wt Readings from Last 3 Encounters:   08/13/19 240 lb 4.8 oz (109 kg)     There is no height or weight on file to calculate BMI.    CBC:   Lab Results   Component Value Date    WBC 8.2 08/13/2019    RBC 4.68 08/13/2019    HGB 13.0 08/13/2019    HCT 39.7 08/13/2019    MCV 84.8 08/13/2019    RDW 12.5 08/13/2019     08/13/2019       CMP: No results found for: NA, K, CL, CO2, BUN, CREATININE, GFRAA, AGRATIO, LABGLOM, GLUCOSE, PROT, CALCIUM, BILITOT, ALKPHOS, AST, ALT    POC Tests:   Recent Labs     08/13/19  1034   POCGLU 244*       Coags:   Lab Results   Component Value Date    APTT 22.7 08/13/2019       HCG (If Applicable): No results found for: PREGTESTUR, PREGSERUM, HCG, HCGQUANT     ABGs: No

## 2019-08-13 NOTE — ANESTHESIA POSTPROCEDURE EVALUATION
Department of Anesthesiology  Postprocedure Note    Patient: Lev Doan  MRN: [id number]  [place]: 1950  Date of evaluation: 8/13/2019  Time:  6:11 PM     Procedure Summary     Date:  08/13/19 Room / Location:  [address] OR    Anesthesia Start:  1665 Anesthesia Stop:  1801    Procedure:  HOLMIUM STANDBY - NEPHROLITHOTOMY PERCUTANEOUS, DIALTION OF TRACT, STONE BASKETTING, ANTIGRADE NEPHROSTOGRAM, STENT PLACEMENT (Left ) Diagnosis:  (LEFT KIDNEY STONES)    Surgeon:  Alton Elaine MD Responsible Provider:  Juanjose Molina MD    Anesthesia Type:  general ASA Status:  3          Anesthesia Type: general    Linda Phase I:      Linda Phase II: Linda Score: 9    Last vitals: Reviewed and per EMR flowsheets.        Anesthesia Post Evaluation    Patient location during evaluation: PACU  Patient participation: complete - patient participated  Level of consciousness: awake  Pain score: 1  Airway patency: patent  Nausea & Vomiting: no nausea and no vomiting  Complications: no  Cardiovascular status: blood pressure returned to baseline and hemodynamically stable  Respiratory status: acceptable  Hydration status: euvolemic

## 2019-08-13 NOTE — BRIEF OP NOTE
Brief Postoperative Note    Sonam Bellamy  YOB: 1950  [id number]    Pre-operative Diagnosis: Left kidney stone for PCNL    Post-operative Diagnosis: Same    Procedure: wire to bladder    Anesthesia: General    Surgeons/Assistants: Luis Manuel Hernandez MD    Estimated Blood Loss: less than 50     Complications: Other: small ureteral perforation    Specimens: Was Not Obtained    Findings: 5 F catheter and superstiff guide inserted through upper pole stone, traversing a small proximal ureteral perforation (caused by overinflation of the ureteral occlusion balloon placed earlier), and positioned at the UPJ (with wire well within the urinary bladder); discussed with DR Jamilah Samayoa.      Electronically signed by Luis Manuel Hernandez MD on 8/13/2019 at 4:24 PM

## 2019-08-13 NOTE — ANESTHESIA PRE PROCEDURE
Anesthesia Plan      general     ASA 3       Induction: intravenous. Anesthetic plan and risks discussed with patient. Plan discussed with CRNA.                   See Castro MD   8/13/2019

## 2019-08-14 ENCOUNTER — APPOINTMENT (OUTPATIENT)
Dept: GENERAL RADIOLOGY | Age: 69
End: 2019-08-14
Attending: UROLOGY
Payer: MEDICARE

## 2019-08-14 LAB
ANION GAP SERPL CALCULATED.3IONS-SCNC: 13 MMOL/L (ref 9–17)
BUN BLDV-MCNC: 24 MG/DL (ref 8–23)
BUN/CREAT BLD: ABNORMAL (ref 9–20)
CALCIUM SERPL-MCNC: 9.4 MG/DL (ref 8.6–10.4)
CHLORIDE BLD-SCNC: 102 MMOL/L (ref 98–107)
CO2: 23 MMOL/L (ref 20–31)
CREAT SERPL-MCNC: 1.48 MG/DL (ref 0.5–0.9)
CULTURE: NO GROWTH
GFR AFRICAN AMERICAN: 43 ML/MIN
GFR NON-AFRICAN AMERICAN: 35 ML/MIN
GFR SERPL CREATININE-BSD FRML MDRD: ABNORMAL ML/MIN/{1.73_M2}
GFR SERPL CREATININE-BSD FRML MDRD: ABNORMAL ML/MIN/{1.73_M2}
GLUCOSE BLD-MCNC: 181 MG/DL (ref 65–105)
GLUCOSE BLD-MCNC: 191 MG/DL (ref 65–105)
GLUCOSE BLD-MCNC: 201 MG/DL (ref 70–99)
GLUCOSE BLD-MCNC: 212 MG/DL (ref 65–105)
HCT VFR BLD CALC: 35.9 % (ref 36.3–47.1)
HCT VFR BLD CALC: 37.2 % (ref 36.3–47.1)
HEMOGLOBIN: 11.3 G/DL (ref 11.9–15.1)
HEMOGLOBIN: 11.7 G/DL (ref 11.9–15.1)
Lab: NORMAL
MCH RBC QN AUTO: 27 PG (ref 25.2–33.5)
MCHC RBC AUTO-ENTMCNC: 31.5 G/DL (ref 28.4–34.8)
MCV RBC AUTO: 85.7 FL (ref 82.6–102.9)
NRBC AUTOMATED: 0 PER 100 WBC
PDW BLD-RTO: 12.7 % (ref 11.8–14.4)
PLATELET # BLD: 243 K/UL (ref 138–453)
PMV BLD AUTO: 10.2 FL (ref 8.1–13.5)
POTASSIUM SERPL-SCNC: 4.3 MMOL/L (ref 3.7–5.3)
RBC # BLD: 4.19 M/UL (ref 3.95–5.11)
SODIUM BLD-SCNC: 138 MMOL/L (ref 135–144)
SPECIMEN DESCRIPTION: NORMAL
WBC # BLD: 11.5 K/UL (ref 3.5–11.3)

## 2019-08-14 PROCEDURE — 2580000003 HC RX 258: Performed by: STUDENT IN AN ORGANIZED HEALTH CARE EDUCATION/TRAINING PROGRAM

## 2019-08-14 PROCEDURE — 97535 SELF CARE MNGMENT TRAINING: CPT

## 2019-08-14 PROCEDURE — 85018 HEMOGLOBIN: CPT

## 2019-08-14 PROCEDURE — 85014 HEMATOCRIT: CPT

## 2019-08-14 PROCEDURE — 96365 THER/PROPH/DIAG IV INF INIT: CPT

## 2019-08-14 PROCEDURE — 97530 THERAPEUTIC ACTIVITIES: CPT

## 2019-08-14 PROCEDURE — 51798 US URINE CAPACITY MEASURE: CPT

## 2019-08-14 PROCEDURE — 80048 BASIC METABOLIC PNL TOTAL CA: CPT

## 2019-08-14 PROCEDURE — 6360000002 HC RX W HCPCS: Performed by: STUDENT IN AN ORGANIZED HEALTH CARE EDUCATION/TRAINING PROGRAM

## 2019-08-14 PROCEDURE — 6370000000 HC RX 637 (ALT 250 FOR IP): Performed by: STUDENT IN AN ORGANIZED HEALTH CARE EDUCATION/TRAINING PROGRAM

## 2019-08-14 PROCEDURE — G0378 HOSPITAL OBSERVATION PER HR: HCPCS

## 2019-08-14 PROCEDURE — 82947 ASSAY GLUCOSE BLOOD QUANT: CPT

## 2019-08-14 PROCEDURE — 96375 TX/PRO/DX INJ NEW DRUG ADDON: CPT

## 2019-08-14 PROCEDURE — 71045 X-RAY EXAM CHEST 1 VIEW: CPT

## 2019-08-14 PROCEDURE — 97166 OT EVAL MOD COMPLEX 45 MIN: CPT

## 2019-08-14 PROCEDURE — 85027 COMPLETE CBC AUTOMATED: CPT

## 2019-08-14 PROCEDURE — 36415 COLL VENOUS BLD VENIPUNCTURE: CPT

## 2019-08-14 RX ADMIN — INSULIN LISPRO 6 UNITS: 100 INJECTION, SOLUTION INTRAVENOUS; SUBCUTANEOUS at 17:28

## 2019-08-14 RX ADMIN — DOCUSATE SODIUM 100 MG: 100 CAPSULE, LIQUID FILLED ORAL at 21:33

## 2019-08-14 RX ADMIN — DOCUSATE SODIUM 100 MG: 100 CAPSULE, LIQUID FILLED ORAL at 07:45

## 2019-08-14 RX ADMIN — HYDROCODONE BITARTRATE AND ACETAMINOPHEN 2 TABLET: 5; 325 TABLET ORAL at 15:41

## 2019-08-14 RX ADMIN — CEFAZOLIN 1 G: 1 INJECTION, POWDER, FOR SOLUTION INTRAMUSCULAR; INTRAVENOUS at 21:32

## 2019-08-14 RX ADMIN — Medication 10 ML: at 21:33

## 2019-08-14 RX ADMIN — TAMSULOSIN HYDROCHLORIDE 0.4 MG: 0.4 CAPSULE ORAL at 07:45

## 2019-08-14 RX ADMIN — CEFAZOLIN 1 G: 1 INJECTION, POWDER, FOR SOLUTION INTRAMUSCULAR; INTRAVENOUS at 13:13

## 2019-08-14 RX ADMIN — HYDROCODONE BITARTRATE AND ACETAMINOPHEN 2 TABLET: 5; 325 TABLET ORAL at 08:53

## 2019-08-14 RX ADMIN — INSULIN LISPRO 4 UNITS: 100 INJECTION, SOLUTION INTRAVENOUS; SUBCUTANEOUS at 12:28

## 2019-08-14 RX ADMIN — CITALOPRAM 10 MG: 10 TABLET, FILM COATED ORAL at 07:45

## 2019-08-14 RX ADMIN — CEFAZOLIN 1 G: 1 INJECTION, POWDER, FOR SOLUTION INTRAMUSCULAR; INTRAVENOUS at 04:39

## 2019-08-14 RX ADMIN — INSULIN GLARGINE 60 UNITS: 100 INJECTION, SOLUTION SUBCUTANEOUS at 10:42

## 2019-08-14 RX ADMIN — MORPHINE SULFATE 2 MG: 2 INJECTION, SOLUTION INTRAMUSCULAR; INTRAVENOUS at 03:00

## 2019-08-14 RX ADMIN — INSULIN LISPRO 1 UNITS: 100 INJECTION, SOLUTION INTRAVENOUS; SUBCUTANEOUS at 21:33

## 2019-08-14 ASSESSMENT — PAIN SCALES - GENERAL
PAINLEVEL_OUTOF10: 8
PAINLEVEL_OUTOF10: 10

## 2019-08-14 ASSESSMENT — PAIN DESCRIPTION - PAIN TYPE: TYPE: SURGICAL PAIN

## 2019-08-14 ASSESSMENT — PAIN DESCRIPTION - LOCATION: LOCATION: ABDOMEN

## 2019-08-14 NOTE — PROGRESS NOTES
Dr. Guidry Chimera notified of blood sugar of 244. No new orders obtained at this time.  Patient remains NPO
Pt arrived to unit via bed. A&Ox4. Call light within reach. Complains of pain and nausea.
Pt informed of continued delay in start of ir part of procedure.
Grooming: Modified independent   UE Bathing: Supervision  LE Bathing: Moderate assistance  UE Dressing: Supervision  LE Dressing:  Moderate assistance  Toileting: Minimal assistance  Tone RUE  RUE Tone: Normotonic  Tone LUE  LUE Tone: Normotonic  Coordination  Movements Are Fluid And Coordinated: Yes     Bed mobility  Supine to Sit: (pt walking in hallway with RN )  Sit to Supine: Contact guard assistance(increased time to complet e)  Scooting: Contact guard assistance  Transfers  Sit to stand: (pt walking in hallway with RN upon arrival )  Stand to sit: Stand by assistance     Cognition  Overall Cognitive Status: WFL     Sensation  Overall Sensation Status: WFL      LUE AROM : WFL  Left Hand AROM: WFL  RUE AROM : WFL  Right Hand AROM: WFL    LUE Strength  Gross LUE Strength: WFL  L Hand General: 4+/5  RUE Strength  Gross RUE Strength: WFL  R Hand General: 4+/5      Plan   Plan  Times per week: 3-4x/wk    AM-PAC Score   AM-[facility] Inpatient Daily Activity Raw Score: 18 (08/14/19 1453)  AM-PAC Inpatient ADL T-Scale Score : 38.66 (08/14/19 1453)  ADL Inpatient CMS 0-100% Score: 46.65 (08/14/19 1453)  ADL Inpatient CMS G-Code Modifier : CK (08/14/19 1453)    Goals  Short term goals  Time Frame for Short term goals: pt will, by discharge  Short term goal 1: complete LB ADLs with min A, set up and AE, as needed  Short term goal 2: complete UB ADLS and grooming tasks with mod I and set up  Short term goal 3: increase activity tolerance to 25+ minutes in order to participate in daily tasks  Short term goal 4: dem ~10 minutes dynamic standing tolerance with supervision and LRD, as needed, in order to complete functional tasks  Short term goal 5: dem supervision during functional transfers/functional mobility with LRD, as needed     Therapy Time   Individual Concurrent Group Co-treatment   Time In 1014         Time Out 1041         Minutes Radha 53, OTR/L

## 2019-08-14 NOTE — CARE COORDINATION
Case Management Initial Discharge Plan  Valerie Marino,             Met with:patient to discuss discharge plans. Information verified: address, contacts, phone number, , insurance Yes  PCP: Roland Kumar  Date of last visit: 1 month ago    Insurance Provider: Medicare- Primary, Medical [place] - Secondary    Discharge Planning    Living Arrangements:  Alone   Support Systems:  Children, Family Members    Home has 2 stories  3 stairs to climb to get into front door, one flight of stairs to climb to reach second floor  Location of bedroom/bathroom in home is on the main level    Patient able to perform ADL's:Independent    Current Services (outpatient & in home) none  DME equipment: cane, nebulizer, glucometer  DME provider: n/a    Pharmacy: [facility] in [address] Medications:  No  Does patient want to participate in local refill/ meds to beds program?  Yes    Potential Assistance Needed:  N/A    Patient agreeable to home care: No  [place] of choice provided:  n/a    Prior SNF/Rehab Placement and Facility: [facility] for therapy after knee replacement   Agreeable to SNF/Rehab: No  [place] of choice provided: n/a   Evaluation: n/a    Expected Discharge date:  08/15/19  Patient expects to be discharged to:  Home independently  Follow Up Appointment: Best Day/ Time: Monday AM    Transportation provider: family  Transportation arrangements needed for discharge: No    Readmission Risk              Risk of Unplanned Readmission:        11             Does patient have a readmission risk score greater than 14?: No  If yes, follow-up appointment must be made within 7 days of discharge.      Discharge Plan: Home independently          Electronically signed by Lis Coronado RN on 19 at 3:05 PM

## 2019-08-15 VITALS
TEMPERATURE: 97.8 F | SYSTOLIC BLOOD PRESSURE: 150 MMHG | HEART RATE: 69 BPM | HEIGHT: 68 IN | RESPIRATION RATE: 16 BRPM | OXYGEN SATURATION: 98 % | WEIGHT: 253 LBS | BODY MASS INDEX: 38.34 KG/M2 | DIASTOLIC BLOOD PRESSURE: 66 MMHG

## 2019-08-15 LAB
ANION GAP SERPL CALCULATED.3IONS-SCNC: 12 MMOL/L (ref 9–17)
BUN BLDV-MCNC: 26 MG/DL (ref 8–23)
BUN/CREAT BLD: ABNORMAL (ref 9–20)
CALCIUM SERPL-MCNC: 9.9 MG/DL (ref 8.6–10.4)
CHLORIDE BLD-SCNC: 102 MMOL/L (ref 98–107)
CO2: 22 MMOL/L (ref 20–31)
CREAT SERPL-MCNC: 1.32 MG/DL (ref 0.5–0.9)
GFR AFRICAN AMERICAN: 49 ML/MIN
GFR NON-AFRICAN AMERICAN: 40 ML/MIN
GFR SERPL CREATININE-BSD FRML MDRD: ABNORMAL ML/MIN/{1.73_M2}
GFR SERPL CREATININE-BSD FRML MDRD: ABNORMAL ML/MIN/{1.73_M2}
GLUCOSE BLD-MCNC: 166 MG/DL (ref 65–105)
GLUCOSE BLD-MCNC: 181 MG/DL (ref 70–99)
HCT VFR BLD CALC: 34.9 % (ref 36.3–47.1)
HEMOGLOBIN: 11.1 G/DL (ref 11.9–15.1)
MCH RBC QN AUTO: 27.6 PG (ref 25.2–33.5)
MCHC RBC AUTO-ENTMCNC: 31.8 G/DL (ref 28.4–34.8)
MCV RBC AUTO: 86.8 FL (ref 82.6–102.9)
NRBC AUTOMATED: 0 PER 100 WBC
PDW BLD-RTO: 12.7 % (ref 11.8–14.4)
PLATELET # BLD: 213 K/UL (ref 138–453)
PMV BLD AUTO: 10 FL (ref 8.1–13.5)
POTASSIUM SERPL-SCNC: 4.2 MMOL/L (ref 3.7–5.3)
RBC # BLD: 4.02 M/UL (ref 3.95–5.11)
SODIUM BLD-SCNC: 136 MMOL/L (ref 135–144)
WBC # BLD: 8.9 K/UL (ref 3.5–11.3)

## 2019-08-15 PROCEDURE — 6360000002 HC RX W HCPCS: Performed by: STUDENT IN AN ORGANIZED HEALTH CARE EDUCATION/TRAINING PROGRAM

## 2019-08-15 PROCEDURE — 51798 US URINE CAPACITY MEASURE: CPT

## 2019-08-15 PROCEDURE — 96376 TX/PRO/DX INJ SAME DRUG ADON: CPT

## 2019-08-15 PROCEDURE — G0378 HOSPITAL OBSERVATION PER HR: HCPCS

## 2019-08-15 PROCEDURE — 82947 ASSAY GLUCOSE BLOOD QUANT: CPT

## 2019-08-15 PROCEDURE — 80048 BASIC METABOLIC PNL TOTAL CA: CPT

## 2019-08-15 PROCEDURE — 85027 COMPLETE CBC AUTOMATED: CPT

## 2019-08-15 PROCEDURE — 6370000000 HC RX 637 (ALT 250 FOR IP): Performed by: STUDENT IN AN ORGANIZED HEALTH CARE EDUCATION/TRAINING PROGRAM

## 2019-08-15 PROCEDURE — 36415 COLL VENOUS BLD VENIPUNCTURE: CPT

## 2019-08-15 PROCEDURE — 2580000003 HC RX 258: Performed by: STUDENT IN AN ORGANIZED HEALTH CARE EDUCATION/TRAINING PROGRAM

## 2019-08-15 RX ORDER — OXYBUTYNIN CHLORIDE 5 MG/1
5 TABLET, EXTENDED RELEASE ORAL DAILY
Qty: 30 TABLET | Refills: 1 | Status: SHIPPED | OUTPATIENT
Start: 2019-08-15

## 2019-08-15 RX ORDER — OXYBUTYNIN CHLORIDE 5 MG/1
5 TABLET, EXTENDED RELEASE ORAL NIGHTLY
Status: DISCONTINUED | OUTPATIENT
Start: 2019-08-15 | End: 2019-08-15 | Stop reason: HOSPADM

## 2019-08-15 RX ADMIN — CEFAZOLIN 1 G: 1 INJECTION, POWDER, FOR SOLUTION INTRAMUSCULAR; INTRAVENOUS at 05:53

## 2019-08-15 RX ADMIN — HYDROCODONE BITARTRATE AND ACETAMINOPHEN 2 TABLET: 5; 325 TABLET ORAL at 00:45

## 2019-08-15 RX ADMIN — TAMSULOSIN HYDROCHLORIDE 0.4 MG: 0.4 CAPSULE ORAL at 08:22

## 2019-08-15 RX ADMIN — OXYBUTYNIN CHLORIDE 5 MG: 5 TABLET, EXTENDED RELEASE ORAL at 08:22

## 2019-08-15 RX ADMIN — INSULIN GLARGINE 60 UNITS: 100 INJECTION, SOLUTION SUBCUTANEOUS at 08:23

## 2019-08-15 RX ADMIN — HYDROCODONE BITARTRATE AND ACETAMINOPHEN 1 TABLET: 5; 325 TABLET ORAL at 11:18

## 2019-08-15 RX ADMIN — CITALOPRAM 10 MG: 10 TABLET, FILM COATED ORAL at 08:22

## 2019-08-15 RX ADMIN — DOCUSATE SODIUM 100 MG: 100 CAPSULE, LIQUID FILLED ORAL at 08:22

## 2019-08-15 RX ADMIN — MORPHINE SULFATE 2 MG: 2 INJECTION, SOLUTION INTRAMUSCULAR; INTRAVENOUS at 03:42

## 2019-08-15 ASSESSMENT — PAIN SCALES - GENERAL
PAINLEVEL_OUTOF10: 9
PAINLEVEL_OUTOF10: 5
PAINLEVEL_OUTOF10: 3
PAINLEVEL_OUTOF10: 9
PAINLEVEL_OUTOF10: 5

## 2019-08-15 NOTE — DISCHARGE SUMMARY
DISCHARGE SUMMARY NOTE:      Patient Identification  PATIENT: Lalita Heart is a 81 y.o. female. MRN: [id number]  :  1950  Admit Date:  2019  Discharge date:   No discharge date for patient encounter. Disposition: home  Discharged Condition:  good  Discharge Diagnoses:   Patient Active Problem List   Diagnosis    Post-op pain       Consults: none    Surgery: Procedure(s):  HOLMIUM STANDBY - NEPHROLITHOTOMY PERCUTANEOUS, DIALTION OF TRACT, STONE BASKETTING, ANTIGRADE NEPHROSTOGRAM, STENT PLACEMENT     Patient Instructions: Activity: As tolerated  Diet: As tolerated  Patient told to follow up with Dr. Lashawn Reed  in 6 week(s). Discharge Medications:    Faye Satanta   Home Medication Instructions [id number]    Printed on:08/15/19 6194   Medication Information                      atorvastatin (LIPITOR) 40 MG tablet  Take 40 mg by mouth daily             citalopram (CELEXA) 10 MG tablet  Take 10 mg by mouth daily             docusate sodium (COLACE) 100 MG capsule  Take 1 capsule by mouth 2 times daily             fenofibrate (TRICOR) 54 MG tablet  [facility] Please dispense generic fenofibrate unless prescriber denote             HYDROcodone-acetaminophen (NORCO) 5-325 MG per tablet  Take 1 tablet by mouth every 4 hours as needed for Pain for up to 5 days. Intended supply: 5 days. Take lowest dose possible to manage pain             Insulin Detemir (LEVEMIR FLEXPEN SC)  Inject 60 Units into the skin daily             lisinopril (PRINIVIL;ZESTRIL) 10 MG tablet  Take 12.5 mg by mouth daily             metFORMIN (GLUCOPHAGE-XR) 500 MG extended release tablet  Take 500 mg by mouth daily (with breakfast)             Semaglutide (OZEMPIC) 0.25 or 0.5 MG/DOSE SOPN  Inject 0.25 mg into the skin once a week Thursday             tamsulosin (FLOMAX) 0.4 MG capsule  Take 1 capsule by mouth daily                 Hospital course:  The

## (undated) DEVICE — Z DISCONTINUED NO SUB IDED TAPE UMB W1/8XL36IN WHT COT STRND NONRADIOPAQUE

## (undated) DEVICE — CATCHER STONE TBNG ADPT SWISS LITHOCLAST

## (undated) DEVICE — PROBE LITHO L403MM DIA3.8MM US SWISS LITHOCLAST

## (undated) DEVICE — PROBE LITHO L570MM DIA1MM PNEUMAT SWISS LITHOCLAST

## (undated) DEVICE — PAD,ABDOMINAL,5"X9",ST,LF,25/BX: Brand: MEDLINE INDUSTRIES, INC.

## (undated) DEVICE — CATHETER URETH 16FR BLLN 5CC SIL ALLY W/ SIL HYDRGEL 2 W F

## (undated) DEVICE — 3M™ IOBAN™ 2 ANTIMICROBIAL INCISE DRAPE 6650EZ: Brand: IOBAN™ 2

## (undated) DEVICE — DRAINBAG,ANTI-REFLUX TOWER,L/F,2000ML,LL: Brand: MEDLINE

## (undated) DEVICE — CRANIOTOMY DRAPE, STERILE: Brand: MEDLINE

## (undated) DEVICE — PACK PROCEDURE SURG CYSTO SVMMC LF

## (undated) DEVICE — NITINOL STONE RETRIEVAL BASKET: Brand: ZERO TIP

## (undated) DEVICE — STRAP,CATHETER,ELASTIC,HOOK&LOOP: Brand: MEDLINE

## (undated) DEVICE — SHEET DRAPE FULL 70X100

## (undated) DEVICE — DUAL LUMEN URETERAL CATHETER

## (undated) DEVICE — LIEBERMAN INTRODUCER: Brand: LIEBERMAN

## (undated) DEVICE — SUTURE ETHBND EXCEL SZ 1 L30IN NONABSORBABLE GRN L36MM CT-1 X425H

## (undated) DEVICE — HIGH PRESSURE NEPHROSTOMY BALLOON CATHETER KIT: Brand: NEPHROMAX KIT

## (undated) DEVICE — GLOVE SURG SZ 65 THK91MIL LTX FREE SYN POLYISOPRENE

## (undated) DEVICE — DECANTER FLD 9IN ST BG FOR ASEP TRNSF OF FLD

## (undated) DEVICE — MAT FLOOR ULTRA ABS 28X48IN

## (undated) DEVICE — DRAPE,REIN 53X77,STERILE: Brand: MEDLINE

## (undated) DEVICE — Z DISCONTINUED BY MEDLINE USE 2711682 TRAY SKIN PREP DRY W/ PREM GLV

## (undated) DEVICE — GLOVE ORANGE PI 7   MSG9070

## (undated) DEVICE — ADAPTER URO SCP UROLOK LL

## (undated) DEVICE — CATHETER URETH 18FR BLLN 30CC 2 W F INF CTRL BARDX

## (undated) DEVICE — ASPIRATOR FLR L72IN SUCT TB W/ 1 DETACH FISH STK PUSH HNDL

## (undated) DEVICE — CONTAINER,SPECIMEN,4OZ,OR STRL: Brand: MEDLINE

## (undated) DEVICE — GUIDEWIRE URO L150CM DIA0.035IN STIFF NIT HYDRPHLC STR TIP

## (undated) DEVICE — OCCLUSION BALLOON CATHETER: Brand: OCCLUDER

## (undated) DEVICE — DRAPE C ARM UNIV W41XL74IN CLR PLAS XR VELC CLSR POLY STRP

## (undated) DEVICE — SVMMC PEDS/UROLOGY MINOR PACK: Brand: MEDLINE INDUSTRIES, INC.

## (undated) DEVICE — 60 ML SYRINGE LUER-LOCK TIP: Brand: MONOJECT

## (undated) DEVICE — RENTAL LASER HOLMIUM HI WATT STNDBY DAILY MIN APPL

## (undated) DEVICE — SOLUTION SCRB 4OZ 4% CHG H2O AIDED FOR PREOPERATIVE SKIN

## (undated) DEVICE — GARMENT,MEDLINE,DVT,INT,CALF,MED, GEN2: Brand: MEDLINE

## (undated) DEVICE — PROTECTOR ULN NRV PUR FOAM HK LOOP STRP ANATOMICALLY

## (undated) DEVICE — Y-TYPE TUR/BLADDER IRRIGATION SET, REGULATING CLAMP

## (undated) DEVICE — GAUZE,SPONGE,FLUFF,6"X6.75",STRL,5/TRAY: Brand: MEDLINE

## (undated) DEVICE — TUBING, SUCTION, 9/32" X 20', STRAIGHT: Brand: MEDLINE INDUSTRIES, INC.

## (undated) DEVICE — KIT NEPHSTMY CATH DIA7FR LINGEMAN 0.035IN SUP STIFF J DIL

## (undated) DEVICE — S-CURVE URETHRAL DILATOR SET WITH AQ, HYDROPHILIC COATING: Brand: S~CURVE